# Patient Record
Sex: FEMALE | Race: WHITE | NOT HISPANIC OR LATINO
[De-identification: names, ages, dates, MRNs, and addresses within clinical notes are randomized per-mention and may not be internally consistent; named-entity substitution may affect disease eponyms.]

---

## 2018-01-18 ENCOUNTER — APPOINTMENT (OUTPATIENT)
Dept: OBGYN | Facility: CLINIC | Age: 58
End: 2018-01-18
Payer: COMMERCIAL

## 2018-01-18 VITALS
BODY MASS INDEX: 29.32 KG/M2 | SYSTOLIC BLOOD PRESSURE: 128 MMHG | HEIGHT: 65 IN | WEIGHT: 176 LBS | DIASTOLIC BLOOD PRESSURE: 72 MMHG

## 2018-01-18 DIAGNOSIS — Z12.11 ENCOUNTER FOR SCREENING FOR MALIGNANT NEOPLASM OF COLON: ICD-10-CM

## 2018-01-18 DIAGNOSIS — Z80.3 FAMILY HISTORY OF MALIGNANT NEOPLASM OF BREAST: ICD-10-CM

## 2018-01-18 DIAGNOSIS — Z78.9 OTHER SPECIFIED HEALTH STATUS: ICD-10-CM

## 2018-01-18 DIAGNOSIS — Z15.01 GENETIC SUSCEPTIBILITY TO MALIGNANT NEOPLASM OF BREAST: ICD-10-CM

## 2018-01-18 DIAGNOSIS — Z15.09 GENETIC SUSCEPTIBILITY TO MALIGNANT NEOPLASM OF BREAST: ICD-10-CM

## 2018-01-18 DIAGNOSIS — L98.9 DISORDER OF THE SKIN AND SUBCUTANEOUS TISSUE, UNSPECIFIED: ICD-10-CM

## 2018-01-18 DIAGNOSIS — F15.90 OTHER STIMULANT USE, UNSPECIFIED, UNCOMPLICATED: ICD-10-CM

## 2018-01-18 DIAGNOSIS — Z80.41 FAMILY HISTORY OF MALIGNANT NEOPLASM OF OVARY: ICD-10-CM

## 2018-01-18 DIAGNOSIS — R23.2 FLUSHING: ICD-10-CM

## 2018-01-18 DIAGNOSIS — H54.7 UNSPECIFIED VISUAL LOSS: ICD-10-CM

## 2018-01-18 DIAGNOSIS — Z78.0 ASYMPTOMATIC MENOPAUSAL STATE: ICD-10-CM

## 2018-01-18 DIAGNOSIS — F17.200 NICOTINE DEPENDENCE, UNSPECIFIED, UNCOMPLICATED: ICD-10-CM

## 2018-01-18 PROBLEM — Z00.00 ENCOUNTER FOR PREVENTIVE HEALTH EXAMINATION: Status: ACTIVE | Noted: 2018-01-18

## 2018-01-18 LAB
DATE COLLECTED: NORMAL
HEMOCCULT SP1 STL QL: NEGATIVE
QUALITY CONTROL: YES

## 2018-01-18 PROCEDURE — 99386 PREV VISIT NEW AGE 40-64: CPT

## 2018-01-18 PROCEDURE — 82270 OCCULT BLOOD FECES: CPT

## 2018-01-18 PROCEDURE — 99204 OFFICE O/P NEW MOD 45 MIN: CPT | Mod: 25

## 2018-01-18 RX ORDER — QUETIAPINE 100 MG/1
100 TABLET, FILM COATED ORAL
Refills: 0 | Status: ACTIVE | COMMUNITY
Start: 2018-01-18

## 2018-01-18 RX ORDER — BUPRENORPHINE HYDROCHLORIDE, NALOXONE HYDROCHLORIDE 12; 3 MG/1; MG/1
12-3 FILM, SOLUBLE BUCCAL; SUBLINGUAL
Refills: 0 | Status: ACTIVE | COMMUNITY
Start: 2018-01-18

## 2018-01-18 RX ORDER — TRIAMCINOLONE ACETONIDE 1 MG/G
0.1 CREAM TOPICAL
Qty: 80 | Refills: 0 | Status: ACTIVE | COMMUNITY
Start: 2017-10-09

## 2018-01-18 RX ORDER — BUPRENORPHINE HYDROCHLORIDE, NALOXONE HYDROCHLORIDE 2; .5 MG/1; MG/1
2-0.5 FILM, SOLUBLE BUCCAL; SUBLINGUAL
Qty: 82 | Refills: 0 | Status: ACTIVE | COMMUNITY
Start: 2017-07-19

## 2018-01-18 RX ORDER — DULOXETINE HYDROCHLORIDE 20 MG/1
20 CAPSULE, DELAYED RELEASE ORAL
Refills: 0 | Status: ACTIVE | COMMUNITY
Start: 2018-01-18

## 2018-01-18 RX ORDER — PREGABALIN 150 MG/1
150 CAPSULE ORAL
Refills: 0 | Status: ACTIVE | COMMUNITY
Start: 2018-01-18

## 2018-01-18 RX ORDER — FLUCONAZOLE 100 MG/1
100 TABLET ORAL
Qty: 16 | Refills: 0 | Status: ACTIVE | COMMUNITY
Start: 2017-07-25

## 2018-01-18 RX ORDER — PREGABALIN 75 MG/1
75 CAPSULE ORAL
Qty: 63 | Refills: 0 | Status: ACTIVE | COMMUNITY
Start: 2017-07-05

## 2018-01-26 LAB
C TRACH RRNA SPEC QL NAA+PROBE: NOT DETECTED
CYTOLOGY CVX/VAG DOC THIN PREP: NORMAL
HPV HIGH+LOW RISK DNA PNL CVX: NOT DETECTED
N GONORRHOEA RRNA SPEC QL NAA+PROBE: NOT DETECTED
SOURCE TP AMPLIFICATION: NORMAL

## 2018-02-01 ENCOUNTER — APPOINTMENT (OUTPATIENT)
Dept: MAMMOGRAPHY | Facility: CLINIC | Age: 58
End: 2018-02-01

## 2018-02-01 ENCOUNTER — APPOINTMENT (OUTPATIENT)
Dept: RADIOLOGY | Facility: CLINIC | Age: 58
End: 2018-02-01

## 2018-02-01 ENCOUNTER — APPOINTMENT (OUTPATIENT)
Dept: ULTRASOUND IMAGING | Facility: CLINIC | Age: 58
End: 2018-02-01

## 2018-02-02 ENCOUNTER — APPOINTMENT (OUTPATIENT)
Dept: MRI IMAGING | Facility: CLINIC | Age: 58
End: 2018-02-02
Payer: COMMERCIAL

## 2018-02-02 ENCOUNTER — OUTPATIENT (OUTPATIENT)
Dept: OUTPATIENT SERVICES | Facility: HOSPITAL | Age: 58
LOS: 1 days | End: 2018-02-02
Payer: COMMERCIAL

## 2018-02-02 DIAGNOSIS — Z00.8 ENCOUNTER FOR OTHER GENERAL EXAMINATION: ICD-10-CM

## 2018-02-02 PROCEDURE — 0159T: CPT | Mod: 26

## 2018-02-02 PROCEDURE — C8937: CPT

## 2018-02-02 PROCEDURE — 77059 MRI BREAST BILATERAL: CPT | Mod: 26

## 2018-02-02 PROCEDURE — C8908: CPT

## 2018-02-02 PROCEDURE — A9585: CPT

## 2018-02-07 ENCOUNTER — RESULT REVIEW (OUTPATIENT)
Age: 58
End: 2018-02-07

## 2018-02-07 ENCOUNTER — APPOINTMENT (OUTPATIENT)
Dept: ULTRASOUND IMAGING | Facility: CLINIC | Age: 58
End: 2018-02-07
Payer: COMMERCIAL

## 2018-02-07 ENCOUNTER — OUTPATIENT (OUTPATIENT)
Dept: OUTPATIENT SERVICES | Facility: HOSPITAL | Age: 58
LOS: 1 days | End: 2018-02-07
Payer: COMMERCIAL

## 2018-02-07 DIAGNOSIS — Z00.8 ENCOUNTER FOR OTHER GENERAL EXAMINATION: ICD-10-CM

## 2018-02-07 PROCEDURE — 10022: CPT

## 2018-02-07 PROCEDURE — 76942 ECHO GUIDE FOR BIOPSY: CPT

## 2018-02-07 PROCEDURE — 76942 ECHO GUIDE FOR BIOPSY: CPT | Mod: 26

## 2018-02-09 ENCOUNTER — OUTPATIENT (OUTPATIENT)
Dept: OUTPATIENT SERVICES | Facility: HOSPITAL | Age: 58
LOS: 1 days | End: 2018-02-09
Payer: COMMERCIAL

## 2018-02-09 VITALS
RESPIRATION RATE: 16 BRPM | TEMPERATURE: 98 F | HEART RATE: 83 BPM | SYSTOLIC BLOOD PRESSURE: 112 MMHG | HEIGHT: 64 IN | WEIGHT: 172.4 LBS | DIASTOLIC BLOOD PRESSURE: 79 MMHG

## 2018-02-09 DIAGNOSIS — Z96.643 PRESENCE OF ARTIFICIAL HIP JOINT, BILATERAL: Chronic | ICD-10-CM

## 2018-02-09 DIAGNOSIS — Z01.818 ENCOUNTER FOR OTHER PREPROCEDURAL EXAMINATION: ICD-10-CM

## 2018-02-09 DIAGNOSIS — R19.00 INTRA-ABDOMINAL AND PELVIC SWELLING, MASS AND LUMP, UNSPECIFIED SITE: ICD-10-CM

## 2018-02-09 LAB
ANION GAP SERPL CALC-SCNC: 15 MMOL/L — SIGNIFICANT CHANGE UP (ref 5–17)
APTT BLD: 29.2 SEC — SIGNIFICANT CHANGE UP (ref 27.5–37.4)
BASOPHILS # BLD AUTO: 0.1 K/UL — SIGNIFICANT CHANGE UP (ref 0–0.2)
BASOPHILS NFR BLD AUTO: 2.2 % — HIGH (ref 0–2)
BLD GP AB SCN SERPL QL: SIGNIFICANT CHANGE UP
BUN SERPL-MCNC: 18 MG/DL — SIGNIFICANT CHANGE UP (ref 8–20)
CALCIUM SERPL-MCNC: 9.5 MG/DL — SIGNIFICANT CHANGE UP (ref 8.6–10.2)
CHLORIDE SERPL-SCNC: 100 MMOL/L — SIGNIFICANT CHANGE UP (ref 98–107)
CO2 SERPL-SCNC: 25 MMOL/L — SIGNIFICANT CHANGE UP (ref 22–29)
CREAT SERPL-MCNC: 0.75 MG/DL — SIGNIFICANT CHANGE UP (ref 0.5–1.3)
EOSINOPHIL # BLD AUTO: 0.3 K/UL — SIGNIFICANT CHANGE UP (ref 0–0.5)
EOSINOPHIL NFR BLD AUTO: 5.1 % — SIGNIFICANT CHANGE UP (ref 0–6)
GLUCOSE SERPL-MCNC: 96 MG/DL — SIGNIFICANT CHANGE UP (ref 70–115)
HBA1C BLD-MCNC: 5.4 % — SIGNIFICANT CHANGE UP (ref 4–5.6)
HCT VFR BLD CALC: 44.3 % — SIGNIFICANT CHANGE UP (ref 37–47)
HGB BLD-MCNC: 15 G/DL — SIGNIFICANT CHANGE UP (ref 12–16)
INR BLD: 0.97 RATIO — SIGNIFICANT CHANGE UP (ref 0.88–1.16)
LYMPHOCYTES # BLD AUTO: 2.7 K/UL — SIGNIFICANT CHANGE UP (ref 1–4.8)
LYMPHOCYTES # BLD AUTO: 46.2 % — SIGNIFICANT CHANGE UP (ref 20–55)
MCHC RBC-ENTMCNC: 28.8 PG — SIGNIFICANT CHANGE UP (ref 27–31)
MCHC RBC-ENTMCNC: 33.9 G/DL — SIGNIFICANT CHANGE UP (ref 32–36)
MCV RBC AUTO: 85.2 FL — SIGNIFICANT CHANGE UP (ref 81–99)
MONOCYTES # BLD AUTO: 0.5 K/UL — SIGNIFICANT CHANGE UP (ref 0–0.8)
MONOCYTES NFR BLD AUTO: 8 % — SIGNIFICANT CHANGE UP (ref 3–10)
NEUTROPHILS # BLD AUTO: 2.3 K/UL — SIGNIFICANT CHANGE UP (ref 1.8–8)
NEUTROPHILS NFR BLD AUTO: 38.3 % — SIGNIFICANT CHANGE UP (ref 37–73)
PLATELET # BLD AUTO: 265 K/UL — SIGNIFICANT CHANGE UP (ref 150–400)
POTASSIUM SERPL-MCNC: 4.6 MMOL/L — SIGNIFICANT CHANGE UP (ref 3.5–5.3)
POTASSIUM SERPL-SCNC: 4.6 MMOL/L — SIGNIFICANT CHANGE UP (ref 3.5–5.3)
PROTHROM AB SERPL-ACNC: 10.7 SEC — SIGNIFICANT CHANGE UP (ref 9.8–12.7)
RBC # BLD: 5.2 M/UL — SIGNIFICANT CHANGE UP (ref 4.4–5.2)
RBC # FLD: 12.9 % — SIGNIFICANT CHANGE UP (ref 11–15.6)
SODIUM SERPL-SCNC: 140 MMOL/L — SIGNIFICANT CHANGE UP (ref 135–145)
TYPE + AB SCN PNL BLD: SIGNIFICANT CHANGE UP
WBC # BLD: 5.9 K/UL — SIGNIFICANT CHANGE UP (ref 4.8–10.8)
WBC # FLD AUTO: 5.9 K/UL — SIGNIFICANT CHANGE UP (ref 4.8–10.8)

## 2018-02-09 PROCEDURE — 80048 BASIC METABOLIC PNL TOTAL CA: CPT

## 2018-02-09 PROCEDURE — 86304 IMMUNOASSAY TUMOR CA 125: CPT

## 2018-02-09 PROCEDURE — 93010 ELECTROCARDIOGRAM REPORT: CPT

## 2018-02-09 PROCEDURE — 85027 COMPLETE CBC AUTOMATED: CPT

## 2018-02-09 PROCEDURE — 85730 THROMBOPLASTIN TIME PARTIAL: CPT

## 2018-02-09 PROCEDURE — G0463: CPT

## 2018-02-09 PROCEDURE — 83036 HEMOGLOBIN GLYCOSYLATED A1C: CPT

## 2018-02-09 PROCEDURE — 93005 ELECTROCARDIOGRAM TRACING: CPT

## 2018-02-09 PROCEDURE — 86900 BLOOD TYPING SEROLOGIC ABO: CPT

## 2018-02-09 PROCEDURE — 85610 PROTHROMBIN TIME: CPT

## 2018-02-09 PROCEDURE — 86901 BLOOD TYPING SEROLOGIC RH(D): CPT

## 2018-02-09 PROCEDURE — 71046 X-RAY EXAM CHEST 2 VIEWS: CPT | Mod: 26

## 2018-02-09 PROCEDURE — 86850 RBC ANTIBODY SCREEN: CPT

## 2018-02-09 PROCEDURE — 71046 X-RAY EXAM CHEST 2 VIEWS: CPT

## 2018-02-09 PROCEDURE — 36415 COLL VENOUS BLD VENIPUNCTURE: CPT

## 2018-02-09 NOTE — ASU PATIENT PROFILE, ADULT - PMH
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

## 2018-02-09 NOTE — H&P PST ADULT - FAMILY HISTORY
Mother  Still living? No  Family history of breast cancer, Age at diagnosis: Age Unknown  Family history of ovarian cancer, Age at diagnosis: Age Unknown

## 2018-02-09 NOTE — H&P PST ADULT - ASSESSMENT
57 year old female with h/o AVN s/p b/l hip replacements. Patient reports a strong family history of breast and ovarian Ca. She states she had genetic testing and was BRCA 2 and chek 2 Positive.   She had been followed closely by her GYN and on the last ultrasound, Patient states there was "mass/ shadow ovary " .  She is now schedule for  robotic assisted total laparoscopic hysterectomy, bilateral salpingo oophorectomy cystoscopy , frozen, possible staging, possible laparotomy.

## 2018-02-09 NOTE — ASU PATIENT PROFILE, ADULT - LEARNING ASSESSMENT (PATIENT) ADDITIONAL COMMENTS
Instructed pt on pre-op instructions, tips for safer surgery, pain management scale and smoking cessation pamphlets, [facility] Schedule given to pt and verbalized understanding of all.

## 2018-02-09 NOTE — H&P PST ADULT - PROBLEM SELECTOR PLAN 1
Medical clearance    Robotic assisted total laparoscopic hysterectomy, bilateral salpingo oophorectomy cystoscopy , frozen, possible staging, possible laparotomy.

## 2018-02-10 LAB — CANCER AG125 SERPL-ACNC: 11 U/ML — SIGNIFICANT CHANGE UP

## 2018-02-27 ENCOUNTER — INPATIENT (INPATIENT)
Facility: HOSPITAL | Age: 58
LOS: 1 days | Discharge: HOME | DRG: 983 | End: 2018-03-01
Attending: OBSTETRICS & GYNECOLOGY | Admitting: OBSTETRICS & GYNECOLOGY
Payer: COMMERCIAL

## 2018-02-27 ENCOUNTER — RESULT REVIEW (OUTPATIENT)
Age: 58
End: 2018-02-27

## 2018-02-27 VITALS
OXYGEN SATURATION: 94 % | RESPIRATION RATE: 18 BRPM | WEIGHT: 160.06 LBS | SYSTOLIC BLOOD PRESSURE: 11 MMHG | HEIGHT: 65 IN | HEART RATE: 88 BPM | TEMPERATURE: 98 F | DIASTOLIC BLOOD PRESSURE: 60 MMHG

## 2018-02-27 DIAGNOSIS — Z87.898 PERSONAL HISTORY OF OTHER SPECIFIED CONDITIONS: ICD-10-CM

## 2018-02-27 DIAGNOSIS — Z96.643 PRESENCE OF ARTIFICIAL HIP JOINT, BILATERAL: Chronic | ICD-10-CM

## 2018-02-27 DIAGNOSIS — G89.18 OTHER ACUTE POSTPROCEDURAL PAIN: ICD-10-CM

## 2018-02-27 DIAGNOSIS — Z15.01 GENETIC SUSCEPTIBILITY TO MALIGNANT NEOPLASM OF BREAST: ICD-10-CM

## 2018-02-27 DIAGNOSIS — R19.00 INTRA-ABDOMINAL AND PELVIC SWELLING, MASS AND LUMP, UNSPECIFIED SITE: ICD-10-CM

## 2018-02-27 LAB
BLD GP AB SCN SERPL QL: SIGNIFICANT CHANGE UP
GLUCOSE BLDC GLUCOMTR-MCNC: 107 MG/DL — HIGH (ref 70–99)
GLUCOSE BLDC GLUCOMTR-MCNC: 108 MG/DL — HIGH (ref 70–99)
GLUCOSE BLDC GLUCOMTR-MCNC: 116 MG/DL — HIGH (ref 70–99)
TYPE + AB SCN PNL BLD: SIGNIFICANT CHANGE UP

## 2018-02-27 PROCEDURE — 88307 TISSUE EXAM BY PATHOLOGIST: CPT | Mod: 26

## 2018-02-27 PROCEDURE — 88305 TISSUE EXAM BY PATHOLOGIST: CPT | Mod: 26

## 2018-02-27 RX ORDER — METHOCARBAMOL 500 MG/1
500 TABLET, FILM COATED ORAL EVERY 8 HOURS
Qty: 0 | Refills: 0 | Status: DISCONTINUED | OUTPATIENT
Start: 2018-02-27 | End: 2018-02-28

## 2018-02-27 RX ORDER — BUPIVACAINE 13.3 MG/ML
20 INJECTION, SUSPENSION, LIPOSOMAL INFILTRATION ONCE
Qty: 0 | Refills: 0 | Status: DISCONTINUED | OUTPATIENT
Start: 2018-02-27 | End: 2018-02-27

## 2018-02-27 RX ORDER — ONDANSETRON 8 MG/1
4 TABLET, FILM COATED ORAL EVERY 6 HOURS
Qty: 0 | Refills: 0 | Status: DISCONTINUED | OUTPATIENT
Start: 2018-02-27 | End: 2018-03-01

## 2018-02-27 RX ORDER — BUPRENORPHINE AND NALOXONE 2; .5 MG/1; MG/1
2 TABLET SUBLINGUAL DAILY
Qty: 0 | Refills: 0 | Status: DISCONTINUED | OUTPATIENT
Start: 2018-02-28 | End: 2018-03-01

## 2018-02-27 RX ORDER — QUETIAPINE FUMARATE 200 MG/1
1 TABLET, FILM COATED ORAL
Qty: 0 | Refills: 0 | COMMUNITY

## 2018-02-27 RX ORDER — ENOXAPARIN SODIUM 100 MG/ML
40 INJECTION SUBCUTANEOUS ONCE
Qty: 0 | Refills: 0 | Status: COMPLETED | OUTPATIENT
Start: 2018-02-27 | End: 2018-02-27

## 2018-02-27 RX ORDER — ENOXAPARIN SODIUM 100 MG/ML
40 INJECTION SUBCUTANEOUS DAILY
Qty: 0 | Refills: 0 | Status: DISCONTINUED | OUTPATIENT
Start: 2018-02-28 | End: 2018-03-01

## 2018-02-27 RX ORDER — HYDROMORPHONE HYDROCHLORIDE 2 MG/ML
1 INJECTION INTRAMUSCULAR; INTRAVENOUS; SUBCUTANEOUS
Qty: 0 | Refills: 0 | Status: DISCONTINUED | OUTPATIENT
Start: 2018-02-27 | End: 2018-02-27

## 2018-02-27 RX ORDER — ACETAMINOPHEN 500 MG
1000 TABLET ORAL ONCE
Qty: 0 | Refills: 0 | Status: COMPLETED | OUTPATIENT
Start: 2018-02-27 | End: 2018-02-27

## 2018-02-27 RX ORDER — DULOXETINE HYDROCHLORIDE 30 MG/1
1 CAPSULE, DELAYED RELEASE ORAL
Qty: 0 | Refills: 0 | COMMUNITY

## 2018-02-27 RX ORDER — SODIUM CHLORIDE 9 MG/ML
3 INJECTION INTRAMUSCULAR; INTRAVENOUS; SUBCUTANEOUS ONCE
Qty: 0 | Refills: 0 | Status: DISCONTINUED | OUTPATIENT
Start: 2018-02-27 | End: 2018-02-27

## 2018-02-27 RX ORDER — FENTANYL CITRATE 50 UG/ML
50 INJECTION INTRAVENOUS
Qty: 0 | Refills: 0 | Status: DISCONTINUED | OUTPATIENT
Start: 2018-02-27 | End: 2018-02-27

## 2018-02-27 RX ORDER — KETOROLAC TROMETHAMINE 30 MG/ML
15 SYRINGE (ML) INJECTION EVERY 6 HOURS
Qty: 0 | Refills: 0 | Status: DISCONTINUED | OUTPATIENT
Start: 2018-02-27 | End: 2018-02-28

## 2018-02-27 RX ORDER — SODIUM CHLORIDE 9 MG/ML
1000 INJECTION, SOLUTION INTRAVENOUS
Qty: 0 | Refills: 0 | Status: DISCONTINUED | OUTPATIENT
Start: 2018-02-27 | End: 2018-02-27

## 2018-02-27 RX ORDER — KETOROLAC TROMETHAMINE 30 MG/ML
30 SYRINGE (ML) INJECTION EVERY 6 HOURS
Qty: 0 | Refills: 0 | Status: DISCONTINUED | OUTPATIENT
Start: 2018-02-27 | End: 2018-02-27

## 2018-02-27 RX ORDER — DEXTROSE MONOHYDRATE, SODIUM CHLORIDE, AND POTASSIUM CHLORIDE 50; .745; 4.5 G/1000ML; G/1000ML; G/1000ML
1000 INJECTION, SOLUTION INTRAVENOUS
Qty: 0 | Refills: 0 | Status: DISCONTINUED | OUTPATIENT
Start: 2018-02-27 | End: 2018-03-01

## 2018-02-27 RX ORDER — ACETAMINOPHEN 500 MG
650 TABLET ORAL EVERY 6 HOURS
Qty: 0 | Refills: 0 | Status: DISCONTINUED | OUTPATIENT
Start: 2018-02-27 | End: 2018-02-28

## 2018-02-27 RX ORDER — CEFOTETAN DISODIUM 1 G
2 VIAL (EA) INJECTION ONCE
Qty: 0 | Refills: 0 | Status: COMPLETED | OUTPATIENT
Start: 2018-02-27 | End: 2018-02-27

## 2018-02-27 RX ORDER — ONDANSETRON 8 MG/1
4 TABLET, FILM COATED ORAL ONCE
Qty: 0 | Refills: 0 | Status: DISCONTINUED | OUTPATIENT
Start: 2018-02-27 | End: 2018-02-27

## 2018-02-27 RX ORDER — QUETIAPINE FUMARATE 200 MG/1
25 TABLET, FILM COATED ORAL DAILY
Qty: 0 | Refills: 0 | Status: DISCONTINUED | OUTPATIENT
Start: 2018-02-27 | End: 2018-03-01

## 2018-02-27 RX ORDER — BUPRENORPHINE AND NALOXONE 2; .5 MG/1; MG/1
1 TABLET SUBLINGUAL AT BEDTIME
Qty: 0 | Refills: 0 | Status: DISCONTINUED | OUTPATIENT
Start: 2018-02-27 | End: 2018-03-01

## 2018-02-27 RX ORDER — CEFOTETAN DISODIUM 1 G
1 VIAL (EA) INJECTION ONCE
Qty: 0 | Refills: 0 | Status: COMPLETED | OUTPATIENT
Start: 2018-02-27 | End: 2018-02-27

## 2018-02-27 RX ORDER — DULOXETINE HYDROCHLORIDE 30 MG/1
60 CAPSULE, DELAYED RELEASE ORAL DAILY
Qty: 0 | Refills: 0 | Status: DISCONTINUED | OUTPATIENT
Start: 2018-02-27 | End: 2018-03-01

## 2018-02-27 RX ADMIN — Medication 15 MILLIGRAM(S): at 16:00

## 2018-02-27 RX ADMIN — HYDROMORPHONE HYDROCHLORIDE 1 MILLIGRAM(S): 2 INJECTION INTRAMUSCULAR; INTRAVENOUS; SUBCUTANEOUS at 12:47

## 2018-02-27 RX ADMIN — METHOCARBAMOL 500 MILLIGRAM(S): 500 TABLET, FILM COATED ORAL at 14:17

## 2018-02-27 RX ADMIN — Medication 400 MILLIGRAM(S): at 11:00

## 2018-02-27 RX ADMIN — HYDROMORPHONE HYDROCHLORIDE 1 MILLIGRAM(S): 2 INJECTION INTRAMUSCULAR; INTRAVENOUS; SUBCUTANEOUS at 13:05

## 2018-02-27 RX ADMIN — Medication 15 MILLIGRAM(S): at 21:48

## 2018-02-27 RX ADMIN — ENOXAPARIN SODIUM 40 MILLIGRAM(S): 100 INJECTION SUBCUTANEOUS at 18:00

## 2018-02-27 RX ADMIN — HYDROMORPHONE HYDROCHLORIDE 1 MILLIGRAM(S): 2 INJECTION INTRAMUSCULAR; INTRAVENOUS; SUBCUTANEOUS at 13:27

## 2018-02-27 RX ADMIN — Medication 15 MILLIGRAM(S): at 15:28

## 2018-02-27 RX ADMIN — Medication 75 MILLIGRAM(S): at 21:48

## 2018-02-27 RX ADMIN — Medication 15 MILLIGRAM(S): at 22:00

## 2018-02-27 RX ADMIN — HYDROMORPHONE HYDROCHLORIDE 1 MILLIGRAM(S): 2 INJECTION INTRAMUSCULAR; INTRAVENOUS; SUBCUTANEOUS at 13:45

## 2018-02-27 RX ADMIN — Medication 650 MILLIGRAM(S): at 20:00

## 2018-02-27 RX ADMIN — DEXTROSE MONOHYDRATE, SODIUM CHLORIDE, AND POTASSIUM CHLORIDE 125 MILLILITER(S): 50; .745; 4.5 INJECTION, SOLUTION INTRAVENOUS at 18:00

## 2018-02-27 RX ADMIN — HYDROMORPHONE HYDROCHLORIDE 1 MILLIGRAM(S): 2 INJECTION INTRAMUSCULAR; INTRAVENOUS; SUBCUTANEOUS at 12:34

## 2018-02-27 RX ADMIN — HYDROMORPHONE HYDROCHLORIDE 1 MILLIGRAM(S): 2 INJECTION INTRAMUSCULAR; INTRAVENOUS; SUBCUTANEOUS at 13:20

## 2018-02-27 RX ADMIN — Medication 650 MILLIGRAM(S): at 19:16

## 2018-02-27 RX ADMIN — SODIUM CHLORIDE 125 MILLILITER(S): 9 INJECTION, SOLUTION INTRAVENOUS at 12:34

## 2018-02-27 RX ADMIN — QUETIAPINE FUMARATE 25 MILLIGRAM(S): 200 TABLET, FILM COATED ORAL at 21:48

## 2018-02-27 RX ADMIN — DULOXETINE HYDROCHLORIDE 60 MILLIGRAM(S): 30 CAPSULE, DELAYED RELEASE ORAL at 21:49

## 2018-02-27 RX ADMIN — Medication 100 GRAM(S): at 19:51

## 2018-02-27 RX ADMIN — Medication 100 GRAM(S): at 10:33

## 2018-02-27 NOTE — PROGRESS NOTE ADULT - PROBLEM SELECTOR PLAN 1
s/p above procedure prophylactically   - continue regular diet as tolerated  - OOB as tolerated   - SCDs and Lovenox for DVT prophylaxis   - Encourage incentive spirometer use  - Continue IVFs  - Follow up AM labs   - Continue pan control as per pain management recommendations s/p above procedure prophylactically   - Give 15mg IV Toradol x 1 Now  - continue regular diet as tolerated  - OOB as tolerated   - SCDs and Lovenox for DVT prophylaxis   - Encourage incentive spirometer use  - Continue IVFs  - Follow up AM labs   - Continue pan control as per pain management recommendations

## 2018-02-27 NOTE — CONSULT NOTE ADULT - SUBJECTIVE AND OBJECTIVE BOX
Chief Complaint: post operative pain    HPI: This is a 56 yo female who has a PMH of AVN, bilateral hip replacements OA who is now POD #0 s/p total hysterectomy robotic. History of suboxone usage for drug abuse about 5 years ago. Her last dose was this morning. Patient seen and examined at bedside in PACU. Patient is reporting that her pain is not well controlled with current regimen. Received 5 mg IV dilaudid over the last hour without relief of her pain. Pain is 8/10 at this time. She is reporting pain is constant and severe across the abdomen. Pain is pressure like and cramping. Pain is worsened with movements. Denies and side effects from medications. Tolerating full diet.       PAST MEDICAL & SURGICAL HISTORY:  Gene mutation: + check 2 gene mutation  BRCA2 positive  AVN (avascular necrosis of bone)  OA (osteoarthritis)  Chronic pain  S/P hip replacement, bilateral      FAMILY HISTORY:  Family history of ovarian cancer (Mother)  Family history of breast cancer (Mother)      SOCIAL HISTORY:  [ ] Denies Smoking, Alcohol, or Drug Use    Allergies    No Known Allergies    Intolerances        PAIN MEDICATIONS:  acetaminophen   Tablet. 650 milliGRAM(s) Oral every 6 hours PRN  DULoxetine 60 milliGRAM(s) Oral daily  ketorolac   Injectable 15 milliGRAM(s) IV Push every 6 hours PRN  methocarbamol 500 milliGRAM(s) Oral every 8 hours PRN  ondansetron Injectable 4 milliGRAM(s) IV Push every 6 hours PRN  ondansetron Injectable 4 milliGRAM(s) IV Push once PRN  pregabalin 150 milliGRAM(s) Oral daily  pregabalin 75 milliGRAM(s) Oral at bedtime  promethazine IVPB 6.25 milliGRAM(s) IV Intermittent once PRN  QUEtiapine 25 milliGRAM(s) Oral daily    Heme:  enoxaparin Injectable 40 milliGRAM(s) SubCutaneous once    Antibiotics:  cefoTEtan  IVPB 1 Gram(s) IV Intermittent once    Cardiovascular:    GI:    Endocrine:    All Other Medications:  dextrose 5% + sodium chloride 0.45% with potassium chloride 20 mEq/L 1000 milliLiter(s) IV Continuous <Continuous>  lactated ringers. 1000 milliLiter(s) IV Continuous <Continuous>      REVIEW OF SYSTEMS:    CONSTITUTIONAL: No fever, weight loss, or fatigue  EYES: No eye pain, visual disturbances, or discharge  ENMT:  No difficulty hearing, tinnitus, vertigo; No sinus or throat pain  NECK: No pain or stiffness  RESPIRATORY: No cough, wheezing, chills or hemoptysis; No shortness of breath  CARDIOVASCULAR: No chest pain, palpitations, dizziness, or leg swelling  GASTROINTESTINAL: + post surgical abdominal or epigastric pain. No nausea, vomiting, or hematemesis; No diarrhea or constipation. No melena or hematochezia.  GENITOURINARY: No dysuria, frequency, hematuria, or incontinence  NEUROLOGICAL: No headaches, memory loss, loss of strength, numbness, or tremors  SKIN: No itching, burning, rashes, or lesions   LYMPH NODES: No enlarged glands  ENDOCRINE: No heat or cold intolerance; No hair loss  MUSCULOSKELETAL: No joint pain or swelling; No muscle, back, or extremity pain  PSYCHIATRIC: No depression, anxiety, mood swings, or difficulty sleeping  HEME/LYMPH: No easy bruising, or bleeding gums  ALLERY AND IMMUNOLOGIC: No hives or eczema      Vital Signs Last 24 Hrs  T(C): 36.2 (27 Feb 2018 12:16), Max: 36.4 (27 Feb 2018 08:16)  T(F): 97.2 (27 Feb 2018 12:16), Max: 97.5 (27 Feb 2018 08:16)  HR: 65 (27 Feb 2018 13:30) (65 - 88)  BP: 113/76 (27 Feb 2018 13:30) (11/60 - 133/94)  BP(mean): --  RR: 15 (27 Feb 2018 13:30) (14 - 18)  SpO2: 100% (27 Feb 2018 13:30) (94% - 100%)    PAIN SCORE:     8    SCALE USED: (1-10 VNRS)             PHYSICAL EXAM:    GENERAL: NAD, well-groomed, well-developed  HEAD:  Atraumatic, Normocephalic  EYES: EOMI, PERRLA, conjunctiva and sclera clear  ENMT: No tonsillar erythema, exudates, or enlargement; Moist mucous membranes, Good dentition, No lesions  NECK: Supple, No JVD, Normal thyroid  NERVOUS SYSTEM:  Alert & Oriented X3, Good concentration; Motor Strength 5/5 B/L upper and lower extremities; DTRs 2+ intact and symmetric  CHEST/LUNG: Clear to percussion bilaterally; No rales, rhonchi, wheezing, or rubs  HEART: Regular rate and rhythm; No murmurs, rubs, or gallops  ABDOMEN: Soft, Nontender, Nondistended; Bowel sounds present, dressing clean, dry, intact  EXTREMITIES:  2+ Peripheral Pulses, No clubbing, cyanosis, or edema  LYMPH: No lymphadenopathy noted  SKIN: No rashes or lesions        LABS:                  RADIOLOGY:    Drug Screen:            [X ]  [place]  Reviewed and Copied to Chart      Search Terms: joselito rosales, 1960 Search Date: 02/27/2018 01:52:19 PM  The Drug Utilization Report below displays all of the controlled substance prescriptions, if any, that your patient has filled in the last twelve months. The information displayed on this report is compiled from pharmacy submissions to the Department, and accurately reflects the information as submitted by the pharmacies.      Others' Prescriptions  Patient Name:	Joselito Rosales	YOB: 1960  Address:	[address] [place] [address]	Sex:	Female  Rx Written	Rx Dispensed	Drug	Quantity	Days Supply	Prescriber Name  01/19/2018	02/10/2018	lyrica 75 mg capsule	63	21	Robby Biggs MD  01/24/2018	01/31/2018	suboxone 2 mg-0.5 mg sl film	82	30	Shanae Molina KRISTEN  01/17/2018	01/20/2018	lyrica 75 mg capsule	63	21	MolinaShanae KRISTEN  01/03/2018	01/03/2018	suboxone 2 mg-0.5 mg sl film	82	30	Shanae Molina KRISTEN  12/05/2017	12/30/2017	lyrica 75 mg capsule	63	21	Shanae Molina KRISTEN  12/05/2017	12/12/2017	lyrica 75 mg capsule	63	21	Jesse Shanae OLIVER KRISTEN  12/05/2017	12/05/2017	suboxone 2 mg-0.5 mg sl film	82	30	Shanae Molina NP  11/21/2017	11/22/2017	lyrica 75 mg capsule	63	21	Shanae Molina NP  11/08/2017	11/08/2017	suboxone 2 mg-0.5 mg sl film	82	30	Shaane Molina NP  10/11/2017	11/02/2017	lyrica 75 mg capsule	63	21	Shanae Molina NP  10/11/2017	10/11/2017	lyrica 75 mg capsule	63	21	Shanae Molina KRISTEN  10/11/2017	10/11/2017	suboxone 2 mg-0.5 mg sl film	82	30	Shanae Molina NP  09/22/2017	09/25/2017	lyrica 75 mg capsule	63	21	Robby Biggs MD  09/13/2017	09/13/2017	suboxone 2 mg-0.5 mg sl film	82	30	Shanae Molina NP  08/16/2017	09/06/2017	lyrica 75 mg capsule	63	21	Shanae Molina F NP  08/16/2017	08/18/2017	lyrica 75 mg capsule	63	21	Shanae Molina F NP  08/16/2017	08/18/2017	suboxone 2 mg-0.5 mg sl film	82	30	Robby Biggs MD  07/05/2017	07/27/2017	lyrica 75 mg capsule	63	21	Shanae Molina NP  07/19/2017	07/21/2017	suboxone 2 mg-0.5 mg sl film	82	30	Shanae Molina NP  07/05/2017	07/08/2017	lyrica 75 mg capsule	63	21	Shanae Molina F NP  06/21/2017	06/24/2017	suboxone 2 mg-0.5 mg sl film	82	30	Shanae Molina F NP  05/24/2017	06/19/2017	lyrica 75 mg capsule	63	21	Shanae Molina F NP  05/24/2017	05/30/2017	lyrica 75 mg capsule	63	21	Shanae Molina F NP  05/24/2017	05/26/2017	suboxone 2 mg-0.5 mg sl film	82	30	Jesse, Shanae F NP  05/09/2017	05/10/2017	lyrica 75 mg capsule	63	21	Jesse, Shanae F NP  04/26/2017	04/29/2017	suboxone 2 mg-0.5 mg sl film	82	30	Jesse, Shanae F NP  03/29/2017	04/20/2017	lyrica 75 mg capsule	63	21	Jesse, Shanae F NP  03/29/2017	04/01/2017	lyrica 75 mg capsule	63	21	Jesse, Shanae F NP  03/29/2017	03/29/2017	suboxone 2 mg-0.5 mg sl film	81	30	Robby Biggs MD  03/01/2017	03/13/2017	lyrica 75 mg capsule	63	21	Shanae Molina F NP  03/01/2017	03/01/2017	suboxone 2 mg-0.5 mg sl film	81	30	Robby Biggs MD Chief Complaint: post operative pain    HPI: This is a 58 yo female who has a PMH of AVN, bilateral hip replacements OA who is now POD #0 s/p total hysterectomy robotic, BSO, cysto, pelvic washings.  History of suboxone usage for drug abuse about 5 years ago. Her last dose was this morning. Patient seen and examined at bedside in PACU. Patient is reporting that her pain is not well controlled with current regimen. Received 5 mg IV dilaudid over the last hour without relief of her pain. Pain is 8/10 at this time. She is reporting pain is constant and severe across the abdomen. Pain is pressure like and cramping. Pain is worsened with movements. Denies and side effects from medications. Tolerating full diet.       PAST MEDICAL & SURGICAL HISTORY:  Gene mutation: + check 2 gene mutation  BRCA2 positive  AVN (avascular necrosis of bone)  OA (osteoarthritis)  Chronic pain  S/P hip replacement, bilateral      FAMILY HISTORY:  Family history of ovarian cancer (Mother)  Family history of breast cancer (Mother)      SOCIAL HISTORY:  [ ] Denies Smoking, Alcohol, or Drug Use    Allergies    No Known Allergies    Intolerances        PAIN MEDICATIONS:  acetaminophen   Tablet. 650 milliGRAM(s) Oral every 6 hours PRN  DULoxetine 60 milliGRAM(s) Oral daily  ketorolac   Injectable 15 milliGRAM(s) IV Push every 6 hours PRN  methocarbamol 500 milliGRAM(s) Oral every 8 hours PRN  ondansetron Injectable 4 milliGRAM(s) IV Push every 6 hours PRN  ondansetron Injectable 4 milliGRAM(s) IV Push once PRN  pregabalin 150 milliGRAM(s) Oral daily  pregabalin 75 milliGRAM(s) Oral at bedtime  promethazine IVPB 6.25 milliGRAM(s) IV Intermittent once PRN  QUEtiapine 25 milliGRAM(s) Oral daily    Heme:  enoxaparin Injectable 40 milliGRAM(s) SubCutaneous once    Antibiotics:  cefoTEtan  IVPB 1 Gram(s) IV Intermittent once    Cardiovascular:    GI:    Endocrine:    All Other Medications:  dextrose 5% + sodium chloride 0.45% with potassium chloride 20 mEq/L 1000 milliLiter(s) IV Continuous <Continuous>  lactated ringers. 1000 milliLiter(s) IV Continuous <Continuous>      REVIEW OF SYSTEMS:    CONSTITUTIONAL: No fever, weight loss, or fatigue  EYES: No eye pain, visual disturbances, or discharge  ENMT:  No difficulty hearing, tinnitus, vertigo; No sinus or throat pain  NECK: No pain or stiffness  RESPIRATORY: No cough, wheezing, chills or hemoptysis; No shortness of breath  CARDIOVASCULAR: No chest pain, palpitations, dizziness, or leg swelling  GASTROINTESTINAL: + post surgical abdominal or epigastric pain. No nausea, vomiting, or hematemesis; No diarrhea or constipation. No melena or hematochezia.  GENITOURINARY: No dysuria, frequency, hematuria, or incontinence  NEUROLOGICAL: No headaches, memory loss, loss of strength, numbness, or tremors  SKIN: No itching, burning, rashes, or lesions   LYMPH NODES: No enlarged glands  ENDOCRINE: No heat or cold intolerance; No hair loss  MUSCULOSKELETAL: No joint pain or swelling; No muscle, back, or extremity pain  PSYCHIATRIC: No depression, anxiety, mood swings, or difficulty sleeping  HEME/LYMPH: No easy bruising, or bleeding gums  ALLERY AND IMMUNOLOGIC: No hives or eczema      Vital Signs Last 24 Hrs  T(C): 36.2 (27 Feb 2018 12:16), Max: 36.4 (27 Feb 2018 08:16)  T(F): 97.2 (27 Feb 2018 12:16), Max: 97.5 (27 Feb 2018 08:16)  HR: 65 (27 Feb 2018 13:30) (65 - 88)  BP: 113/76 (27 Feb 2018 13:30) (11/60 - 133/94)  BP(mean): --  RR: 15 (27 Feb 2018 13:30) (14 - 18)  SpO2: 100% (27 Feb 2018 13:30) (94% - 100%)    PAIN SCORE:     8    SCALE USED: (1-10 VNRS)             PHYSICAL EXAM:    GENERAL: NAD, well-groomed, well-developed  HEAD:  Atraumatic, Normocephalic  EYES: EOMI, PERRLA, conjunctiva and sclera clear  ENMT: No tonsillar erythema, exudates, or enlargement; Moist mucous membranes, Good dentition, No lesions  NECK: Supple, No JVD, Normal thyroid  NERVOUS SYSTEM:  Alert & Oriented X3, Good concentration; Motor Strength 5/5 B/L upper and lower extremities; DTRs 2+ intact and symmetric  CHEST/LUNG: Clear to percussion bilaterally; No rales, rhonchi, wheezing, or rubs  HEART: Regular rate and rhythm; No murmurs, rubs, or gallops  ABDOMEN: Soft, Nontender, Nondistended; Bowel sounds present, dressing clean, dry, intact  EXTREMITIES:  2+ Peripheral Pulses, No clubbing, cyanosis, or edema  LYMPH: No lymphadenopathy noted  SKIN: No rashes or lesions        LABS:                  RADIOLOGY:    Drug Screen:            [X ]  [place]  Reviewed and Copied to Chart      Search Terms: joselito rosales, 1960 Search Date: 02/27/2018 01:52:19 PM  The Drug Utilization Report below displays all of the controlled substance prescriptions, if any, that your patient has filled in the last twelve months. The information displayed on this report is compiled from pharmacy submissions to the Department, and accurately reflects the information as submitted by the pharmacies.      Others' Prescriptions  Patient Name:	Joselito Rosales	YOB: 1960  Address:	[address] [place] [address]	Sex:	Female  Rx Written	Rx Dispensed	Drug	Quantity	Days Supply	Prescriber Name  01/19/2018	02/10/2018	lyrica 75 mg capsule	63	21	Robby Biggs MD  01/24/2018	01/31/2018	suboxone 2 mg-0.5 mg sl film	82	30	Shanae Molina KRISTEN  01/17/2018	01/20/2018	lyrica 75 mg capsule	63	21	JesseShanae KRISTEN  01/03/2018	01/03/2018	suboxone 2 mg-0.5 mg sl film	82	30	Shanae Molina KRISTEN  12/05/2017	12/30/2017	lyrica 75 mg capsule	63	21	Jesse Shanae OLIVER KRISTEN  12/05/2017	12/12/2017	lyrica 75 mg capsule	63	21	Jesse Shanae OLIVER NP  12/05/2017	12/05/2017	suboxone 2 mg-0.5 mg sl film	82	30	Shanae Molina NP  11/21/2017	11/22/2017	lyrica 75 mg capsule	63	21	Jesse Shanae OLIVER KRISTEN  11/08/2017	11/08/2017	suboxone 2 mg-0.5 mg sl film	82	30	Jesse Shanae OLIVER KRISTEN  10/11/2017	11/02/2017	lyrica 75 mg capsule	63	21	Jesse Shanae OLIVER KRISTEN  10/11/2017	10/11/2017	lyrica 75 mg capsule	63	21	Shanae Molina NP  10/11/2017	10/11/2017	suboxone 2 mg-0.5 mg sl film	82	30	Shanae Molina NP  09/22/2017	09/25/2017	lyrica 75 mg capsule	63	21	Robby Biggs MD  09/13/2017	09/13/2017	suboxone 2 mg-0.5 mg sl film	82	30	Shanae Molina NP  08/16/2017	09/06/2017	lyrica 75 mg capsule	63	21	Shanae Molina F NP  08/16/2017	08/18/2017	lyrica 75 mg capsule	63	21	Shanae Molina F NP  08/16/2017	08/18/2017	suboxone 2 mg-0.5 mg sl film	82	30	Robby Biggs MD  07/05/2017	07/27/2017	lyrica 75 mg capsule	63	21	Shanae Molina NP  07/19/2017	07/21/2017	suboxone 2 mg-0.5 mg sl film	82	30	Shanae Molina NP  07/05/2017	07/08/2017	lyrica 75 mg capsule	63	21	Shanae Molina F NP  06/21/2017	06/24/2017	suboxone 2 mg-0.5 mg sl film	82	30	Shanae Molina F NP  05/24/2017	06/19/2017	lyrica 75 mg capsule	63	21	Shanae Molina F NP  05/24/2017	05/30/2017	lyrica 75 mg capsule	63	21	Shanae Molina F NP  05/24/2017	05/26/2017	suboxone 2 mg-0.5 mg sl film	82	30	Shanae Molina F NP  05/09/2017	05/10/2017	lyrica 75 mg capsule	63	21	Shanae Molina F NP  04/26/2017	04/29/2017	suboxone 2 mg-0.5 mg sl film	82	30	Shanae Molina F NP  03/29/2017	04/20/2017	lyrica 75 mg capsule	63	21	Shanae Molina F NP  03/29/2017	04/01/2017	lyrica 75 mg capsule	63	21	Shanae Molina F NP  03/29/2017	03/29/2017	suboxone 2 mg-0.5 mg sl film	81	30	Robby Biggs MD  03/01/2017	03/13/2017	lyrica 75 mg capsule	63	21	Shanae Molina NP  03/01/2017	03/01/2017	suboxone 2 mg-0.5 mg sl film	81	30	Robby Biggs MD

## 2018-02-27 NOTE — CONSULT NOTE ADULT - PROBLEM SELECTOR RECOMMENDATION 9
1. Patient is reporting pain is mildly controlled with regimen  2. Will recommend non narcotic analgesics instead  3. Meds     - stop IV dilaudid and stop iv fentanyl      - change toradol to 15 mg IV q 6 prn severe pain (nurse advised to hold further dosage until 6 hours from prior dosage)      - start tylenol 650 mg q 6 prn mild pain (nurse advised to hold further dosage until 8 hours after prior dosage)      - continue lyrica 150 mg qd and 75 mg qhs as ordered      - continue cymbalta as ordered      - add robaxin 500 mg q 8 prn muscle spasms/abdominal cramping spasms      - patient was advised that she needs to give the medications time to work.       - if not effective can consider increasing robaxin to 750 mg q 8 prn instead and consider increasing lyrica to 150 mg bid if needed instead.   3. bowel regimen as indicated per primary team  4. we will continue to monitor  5. thank you for the consult  6. discussed plan in full with Dr Wilkins and PA from Dr Martins's practice  7. call with questions

## 2018-02-27 NOTE — PROGRESS NOTE ADULT - SUBJECTIVE AND OBJECTIVE BOX
GYNECOLOGIC ONCOLOGY PROGRESS NOTE    POD#0    PROBLEMS:  History of substance abuse  Post-operative pain      Pt seen and examined at bedside in PACU, resting comfortably.     SUBJECTIVE:    Patient is without complaints.  Pain well-controlled.  Denies Nausea, Vomiting or Diarrhea.  Denies shortness of breath, chest pain or dyspnea on exertion.  Tolerating diet.    OBJECTIVE:     VITALS:  T(F): 98 (02-27-18 @ 14:30), Max: 98 (02-27-18 @ 14:30)  HR: 64 (02-27-18 @ 14:30) (64 - 88)  BP: 132/91 (02-27-18 @ 14:30) (11/60 - 133/94)  RR: 15 (02-27-18 @ 14:30) (14 - 18)  SpO2: 100% (02-27-18 @ 14:30) (94% - 100%)    I&O's Summary  Pt. voiding    MEDICATIONS  (STANDING):  buprenorphine 2 mG/naloxone 0.5 mG SL  Tablet 1 Tablet(s) SubLingual at bedtime  cefoTEtan  IVPB 1 Gram(s) IV Intermittent once  dextrose 5% + sodium chloride 0.45% with potassium chloride 20 mEq/L 1000 milliLiter(s) (125 mL/Hr) IV Continuous <Continuous>  DULoxetine 60 milliGRAM(s) Oral daily  enoxaparin Injectable 40 milliGRAM(s) SubCutaneous once  lactated ringers. 1000 milliLiter(s) (125 mL/Hr) IV Continuous <Continuous>  pregabalin 150 milliGRAM(s) Oral daily  pregabalin 75 milliGRAM(s) Oral at bedtime  QUEtiapine 25 milliGRAM(s) Oral daily    MEDICATIONS  (PRN):  acetaminophen   Tablet. 650 milliGRAM(s) Oral every 6 hours PRN Mild Pain (1 - 3)  ketorolac   Injectable 15 milliGRAM(s) IV Push every 6 hours PRN Severe Pain (7 - 10)  methocarbamol 500 milliGRAM(s) Oral every 8 hours PRN muscle spasms/incisional spasms  ondansetron Injectable 4 milliGRAM(s) IV Push every 6 hours PRN Postoperative Nausea and/or Vomiting  ondansetron Injectable 4 milliGRAM(s) IV Push once PRN Nausea and/or Vomiting  promethazine IVPB 6.25 milliGRAM(s) IV Intermittent once PRN Nausea and/or Vomiting      Physical Exam:  Constitutional: NAD  Pulmonary: clear to auscultation bilaterally   Cardiovascular: Regular rate and rhythm   Abdomen: soft, non-tender, non-distended, normal bowel sounds  Extremities: no lower extremity edema or calve tenderness, Chely's sign negative.  Incision: Clean, dry, intact.  Without signs of infection or hernia. GYNECOLOGIC ONCOLOGY PROGRESS NOTE    POD#0    PROBLEMS:  History of substance abuse  Post-operative pain      Pt seen and examined at bedside in PACU, resting comfortably. Complains of pelvic pressure and post operative abdominal tenderness as expected.     SUBJECTIVE:    Pain well-controlled.  Denies Nausea, Vomiting or Diarrhea.  Denies shortness of breath, chest pain or dyspnea on exertion.  Tolerating liquids.    OBJECTIVE:     VITALS:  T(F): 98 (02-27-18 @ 14:30), Max: 98 (02-27-18 @ 14:30)  HR: 64 (02-27-18 @ 14:30) (64 - 88)  BP: 132/91 (02-27-18 @ 14:30) (11/60 - 133/94)  RR: 15 (02-27-18 @ 14:30) (14 - 18)  SpO2: 100% (02-27-18 @ 14:30) (94% - 100%)    I&O's Summary  Pt. voiding    MEDICATIONS  (STANDING):  buprenorphine 2 mG/naloxone 0.5 mG SL  Tablet 1 Tablet(s) SubLingual at bedtime  cefoTEtan  IVPB 1 Gram(s) IV Intermittent once  dextrose 5% + sodium chloride 0.45% with potassium chloride 20 mEq/L 1000 milliLiter(s) (125 mL/Hr) IV Continuous <Continuous>  DULoxetine 60 milliGRAM(s) Oral daily  enoxaparin Injectable 40 milliGRAM(s) SubCutaneous once  lactated ringers. 1000 milliLiter(s) (125 mL/Hr) IV Continuous <Continuous>  pregabalin 150 milliGRAM(s) Oral daily  pregabalin 75 milliGRAM(s) Oral at bedtime  QUEtiapine 25 milliGRAM(s) Oral daily    MEDICATIONS  (PRN):  acetaminophen   Tablet. 650 milliGRAM(s) Oral every 6 hours PRN Mild Pain (1 - 3)  ketorolac   Injectable 15 milliGRAM(s) IV Push every 6 hours PRN Severe Pain (7 - 10)  methocarbamol 500 milliGRAM(s) Oral every 8 hours PRN muscle spasms/incisional spasms  ondansetron Injectable 4 milliGRAM(s) IV Push every 6 hours PRN Postoperative Nausea and/or Vomiting  ondansetron Injectable 4 milliGRAM(s) IV Push once PRN Nausea and/or Vomiting  promethazine IVPB 6.25 milliGRAM(s) IV Intermittent once PRN Nausea and/or Vomiting      Physical Exam:  Constitutional: NAD  Pulmonary: clear to auscultation bilaterally   Cardiovascular: Regular rate and rhythm   Abdomen: soft, non-tender, non-distended, hypoactive bowel sounds  Extremities: no lower extremity edema or calve tenderness, Chely's sign negative.  Incision: dressings clean, dry, intact.  Without signs of infection or hernia.

## 2018-02-27 NOTE — CONSULT NOTE ADULT - PROBLEM SELECTOR RECOMMENDATION 2
1. Patient with a 5 year history of substance abuse s/p bilateral hip replacements. Now managed as outpatient on suboxone 2mg- 0.5 mg sl film two times a day, twice in the morning and once at night. Last dosage was this morning.  2. Spoke to Dr Wilkins and he is recommending that we try and avoid opioid medications at this time as suboxone is still in her system working as an antagonist against the opioids, hence her lack of relief from high dose opioid analgesics. Suboxone effects after stopped can remain in her system for a possible 48 hours post. Recommend that she restart her normal dosage tonight as she was getting as an outpatient  and to control her pain instead with non narcotic analgesics.  2. She is to follow up with addiction medicine as an outpatient for further suboxone treatment

## 2018-02-27 NOTE — CONSULT NOTE ADULT - ASSESSMENT
HPI: This is a 56 yo female who has a PMH of AVN, bilateral hip replacements OA who is now POD #0 s/p total hysterectomy robotic. History of suboxone usage for drug abuse about 5 years ago. Her last dose was this morning. Patient seen and examined at bedside in PACU. Patient is reporting that her pain is not well controlled with current regimen. Received 5 mg IV dilaudid over the last hour without relief of her pain. Pain is 8/10 at this time. She is reporting pain is constant and severe across the abdomen. Pain is pressure like and cramping. Pain is worsened with movements. Denies and side effects from medications. Tolerating full diet. HPI: This is a 56 yo female who has a PMH of AVN, bilateral hip replacements OA who is now POD #0 s/p total hysterectomy robotic, BSO, cysto, pelvic washings.  History of suboxone usage for drug abuse about 5 years ago. Her last dose was this morning. Patient seen and examined at bedside in PACU. Patient is reporting that her pain is not well controlled with current regimen. Received 5 mg IV dilaudid over the last hour without relief of her pain. Pain is 8/10 at this time. She is reporting pain is constant and severe across the abdomen. Pain is pressure like and cramping. Pain is worsened with movements. Denies and side effects from medications. Tolerating full diet.

## 2018-02-28 ENCOUNTER — TRANSCRIPTION ENCOUNTER (OUTPATIENT)
Age: 58
End: 2018-02-28

## 2018-02-28 LAB
ANION GAP SERPL CALC-SCNC: 10 MMOL/L — SIGNIFICANT CHANGE UP (ref 5–17)
BASOPHILS # BLD AUTO: 0 K/UL — SIGNIFICANT CHANGE UP (ref 0–0.2)
BASOPHILS NFR BLD AUTO: 0.1 % — SIGNIFICANT CHANGE UP (ref 0–2)
BUN SERPL-MCNC: 19 MG/DL — SIGNIFICANT CHANGE UP (ref 8–20)
CALCIUM SERPL-MCNC: 8.7 MG/DL — SIGNIFICANT CHANGE UP (ref 8.6–10.2)
CHLORIDE SERPL-SCNC: 102 MMOL/L — SIGNIFICANT CHANGE UP (ref 98–107)
CO2 SERPL-SCNC: 25 MMOL/L — SIGNIFICANT CHANGE UP (ref 22–29)
CREAT SERPL-MCNC: 0.66 MG/DL — SIGNIFICANT CHANGE UP (ref 0.5–1.3)
EOSINOPHIL # BLD AUTO: 0 K/UL — SIGNIFICANT CHANGE UP (ref 0–0.5)
EOSINOPHIL NFR BLD AUTO: 0 % — SIGNIFICANT CHANGE UP (ref 0–6)
GLUCOSE SERPL-MCNC: 130 MG/DL — HIGH (ref 70–115)
HCT VFR BLD CALC: 38.8 % — SIGNIFICANT CHANGE UP (ref 37–47)
HGB BLD-MCNC: 12.8 G/DL — SIGNIFICANT CHANGE UP (ref 12–16)
LYMPHOCYTES # BLD AUTO: 1.5 K/UL — SIGNIFICANT CHANGE UP (ref 1–4.8)
LYMPHOCYTES # BLD AUTO: 15.9 % — LOW (ref 20–55)
MAGNESIUM SERPL-MCNC: 2.4 MG/DL — SIGNIFICANT CHANGE UP (ref 1.6–2.6)
MCHC RBC-ENTMCNC: 28.1 PG — SIGNIFICANT CHANGE UP (ref 27–31)
MCHC RBC-ENTMCNC: 33 G/DL — SIGNIFICANT CHANGE UP (ref 32–36)
MCV RBC AUTO: 85.1 FL — SIGNIFICANT CHANGE UP (ref 81–99)
MONOCYTES # BLD AUTO: 0.8 K/UL — SIGNIFICANT CHANGE UP (ref 0–0.8)
MONOCYTES NFR BLD AUTO: 8.9 % — SIGNIFICANT CHANGE UP (ref 3–10)
NEUTROPHILS # BLD AUTO: 7.1 K/UL — SIGNIFICANT CHANGE UP (ref 1.8–8)
NEUTROPHILS NFR BLD AUTO: 74.8 % — HIGH (ref 37–73)
PHOSPHATE SERPL-MCNC: 2.6 MG/DL — SIGNIFICANT CHANGE UP (ref 2.4–4.7)
PLATELET # BLD AUTO: 241 K/UL — SIGNIFICANT CHANGE UP (ref 150–400)
POTASSIUM SERPL-MCNC: 4.6 MMOL/L — SIGNIFICANT CHANGE UP (ref 3.5–5.3)
POTASSIUM SERPL-SCNC: 4.6 MMOL/L — SIGNIFICANT CHANGE UP (ref 3.5–5.3)
RBC # BLD: 4.56 M/UL — SIGNIFICANT CHANGE UP (ref 4.4–5.2)
RBC # FLD: 12.9 % — SIGNIFICANT CHANGE UP (ref 11–15.6)
SODIUM SERPL-SCNC: 137 MMOL/L — SIGNIFICANT CHANGE UP (ref 135–145)
WBC # BLD: 9.5 K/UL — SIGNIFICANT CHANGE UP (ref 4.8–10.8)
WBC # FLD AUTO: 9.5 K/UL — SIGNIFICANT CHANGE UP (ref 4.8–10.8)

## 2018-02-28 RX ORDER — ACETAMINOPHEN 500 MG
1000 TABLET ORAL ONCE
Qty: 0 | Refills: 0 | Status: COMPLETED | OUTPATIENT
Start: 2018-02-28 | End: 2018-02-28

## 2018-02-28 RX ORDER — KETOROLAC TROMETHAMINE 30 MG/ML
30 SYRINGE (ML) INJECTION ONCE
Qty: 0 | Refills: 0 | Status: DISCONTINUED | OUTPATIENT
Start: 2018-02-28 | End: 2018-02-28

## 2018-02-28 RX ORDER — DIPHENHYDRAMINE HCL 50 MG
25 CAPSULE ORAL EVERY 4 HOURS
Qty: 0 | Refills: 0 | Status: DISCONTINUED | OUTPATIENT
Start: 2018-02-28 | End: 2018-03-01

## 2018-02-28 RX ORDER — DEXAMETHASONE 0.5 MG/5ML
6 ELIXIR ORAL ONCE
Qty: 0 | Refills: 0 | Status: COMPLETED | OUTPATIENT
Start: 2018-02-28 | End: 2018-02-28

## 2018-02-28 RX ORDER — LIDOCAINE 4 G/100G
2 CREAM TOPICAL DAILY
Qty: 0 | Refills: 0 | Status: DISCONTINUED | OUTPATIENT
Start: 2018-02-28 | End: 2018-03-01

## 2018-02-28 RX ORDER — ACETAMINOPHEN 500 MG
975 TABLET ORAL EVERY 6 HOURS
Qty: 0 | Refills: 0 | Status: DISCONTINUED | OUTPATIENT
Start: 2018-02-28 | End: 2018-03-01

## 2018-02-28 RX ORDER — KETOROLAC TROMETHAMINE 30 MG/ML
30 SYRINGE (ML) INJECTION EVERY 6 HOURS
Qty: 0 | Refills: 0 | Status: DISCONTINUED | OUTPATIENT
Start: 2018-02-28 | End: 2018-03-01

## 2018-02-28 RX ORDER — METHOCARBAMOL 500 MG/1
750 TABLET, FILM COATED ORAL EVERY 6 HOURS
Qty: 0 | Refills: 0 | Status: DISCONTINUED | OUTPATIENT
Start: 2018-02-28 | End: 2018-03-01

## 2018-02-28 RX ADMIN — Medication 650 MILLIGRAM(S): at 02:50

## 2018-02-28 RX ADMIN — Medication 15 MILLIGRAM(S): at 05:20

## 2018-02-28 RX ADMIN — Medication 400 MILLIGRAM(S): at 12:09

## 2018-02-28 RX ADMIN — DULOXETINE HYDROCHLORIDE 60 MILLIGRAM(S): 30 CAPSULE, DELAYED RELEASE ORAL at 22:08

## 2018-02-28 RX ADMIN — Medication 15 MILLIGRAM(S): at 04:59

## 2018-02-28 RX ADMIN — Medication 30 MILLIGRAM(S): at 23:27

## 2018-02-28 RX ADMIN — Medication 30 MILLIGRAM(S): at 09:49

## 2018-02-28 RX ADMIN — Medication 30 MILLIGRAM(S): at 10:50

## 2018-02-28 RX ADMIN — Medication 30 MILLIGRAM(S): at 23:45

## 2018-02-28 RX ADMIN — BUPRENORPHINE AND NALOXONE 1 TABLET(S): 2; .5 TABLET SUBLINGUAL at 22:10

## 2018-02-28 RX ADMIN — QUETIAPINE FUMARATE 25 MILLIGRAM(S): 200 TABLET, FILM COATED ORAL at 22:08

## 2018-02-28 RX ADMIN — Medication 30 MILLIGRAM(S): at 18:03

## 2018-02-28 RX ADMIN — BUPRENORPHINE AND NALOXONE 2 TABLET(S): 2; .5 TABLET SUBLINGUAL at 12:08

## 2018-02-28 RX ADMIN — METHOCARBAMOL 750 MILLIGRAM(S): 500 TABLET, FILM COATED ORAL at 17:19

## 2018-02-28 RX ADMIN — METHOCARBAMOL 750 MILLIGRAM(S): 500 TABLET, FILM COATED ORAL at 13:51

## 2018-02-28 RX ADMIN — METHOCARBAMOL 750 MILLIGRAM(S): 500 TABLET, FILM COATED ORAL at 23:27

## 2018-02-28 RX ADMIN — Medication 1000 MILLIGRAM(S): at 12:15

## 2018-02-28 RX ADMIN — BUPRENORPHINE AND NALOXONE 1 TABLET(S): 2; .5 TABLET SUBLINGUAL at 23:00

## 2018-02-28 RX ADMIN — Medication 150 MILLIGRAM(S): at 17:50

## 2018-02-28 RX ADMIN — Medication 6 MILLIGRAM(S): at 17:17

## 2018-02-28 RX ADMIN — Medication 650 MILLIGRAM(S): at 02:00

## 2018-02-28 RX ADMIN — LIDOCAINE 2 PATCH: 4 CREAM TOPICAL at 12:08

## 2018-02-28 RX ADMIN — BUPRENORPHINE AND NALOXONE 2 TABLET(S): 2; .5 TABLET SUBLINGUAL at 13:00

## 2018-02-28 RX ADMIN — Medication 30 MILLIGRAM(S): at 17:18

## 2018-02-28 NOTE — PROGRESS NOTE ADULT - SUBJECTIVE AND OBJECTIVE BOX
Pt seen and examined at bedside. This is a 56 yo female who has a PMH of AVN, bilateral hip replacements OA who is now POD #0 s/p total hysterectomy robotic, BSO, cysto, pelvic washings.  History of suboxone usage for drug abuse about 5 years ago.  She reports moderately controlled pain at this time; 4/10 on VNRS. Pain is located diffusely across the abdomen; cramping and "pressure like" in nature. She does report pain relief with current regimen; however, she is nervous about going home and having an exacerbation. She also reports LBP with intermittent radicular pain. No saddle anesthesia or bowel/bladder dysfunction. No side effects with current regimen. No Fever, chills, HA, CP, SOB, N/V.     VSS; afebrile  NAD, resting comfortably in bed  A&Ox3, PERRL  RRR, S1S2  CTABL  +BS  Surgical Site WDL  CORREA  OOB  Tolerating PO    A/P  56 y/o female with h/o opiate abuse, on Suboxone 6mg/day. now s/p hysterectomy SBO; POD #1  -Cont. Suboxone 6mg/day. To follow up with Dr. Robby Biggs upon d/c.  [phone]  -Ofirmev 1gm IV x1  -Ketorolac 30mg IV Q6h PRN  -Ketorolac 10mg PO Q8 x3 days d/c RX  -Increase Lyrica 150mg BID  -Increase: Robaxin 750mg Q6h  -Lidoderm 1-2 patches daily  -LBP: Consider Decadron 6mg IV x1 dose if OK with surgical team.   -Bowel Regimen per primary team

## 2018-02-28 NOTE — DISCHARGE NOTE ADULT - CARE PLAN
Principal Discharge DX:	Pelvic mass  Goal:	To Improve Care  Assessment and plan of treatment:	Please follow-up with PA in one week for post-op visit/removal of sutures.  Follow-up with Dr. Martins in two weeks to review pathology report. Principal Discharge DX:	Pelvic mass  Goal:	To Improve Care  Assessment and plan of treatment:	Please follow-up with PA in one week for post-op visit/removal of sutures.  Follow-up with Dr. aMrtins in two weeks to review pathology report.  Follow-up with your current PMD within one week of discharge  Follow-up with your current pain management doctor within one week of discharge Principal Discharge DX:	Pelvic mass  Goal:	To Improve Care  Assessment and plan of treatment:	Please follow-up with PA in one week for post-op visit/removal of sutures.  Follow-up with Dr. Martins in two weeks to review pathology report.  Follow-up with your current PMD/ pain management doctor within one week of discharge

## 2018-02-28 NOTE — PROGRESS NOTE ADULT - SUBJECTIVE AND OBJECTIVE BOX
GYNECOLOGIC ONCOLOGY PROGRESS NOTE    POD#1    PROBLEMS:  BRCA2 positive  History of substance abuse  Post-operative pain      Pt seen and examined at bedside. Complains of pain not being well controlled with current regimen, describes pain as a pressure feeling in pelvis radiating to bilateral hips.     SUBJECTIVE:    Flatus: Yes  Denies Nausea, Vomiting or Diarrhea.  Denies shortness of breath, chest pain or dyspnea on exertion.  Tolerating diet.    OBJECTIVE:     VITALS:  T(F): 98.5 (02-28-18 @ 08:41), Max: 98.8 (02-27-18 @ 20:45)  HR: 60 (02-28-18 @ 08:41) (60 - 82)  BP: 105/62 (02-28-18 @ 08:41) (94/55 - 133/94)  RR: 18 (02-28-18 @ 08:41) (14 - 18)  SpO2: 95% (02-28-18 @ 08:41) (92% - 100%)      I&O's Summary    Pt voided 950 mL overnight, blackwell removed post operatively yesterday.       MEDICATIONS  (STANDING):  buprenorphine 2 mG/naloxone 0.5 mG SL  Tablet 1 Tablet(s) SubLingual at bedtime  buprenorphine 2 mG/naloxone 0.5 mG SL  Tablet 2 Tablet(s) SubLingual daily  dextrose 5% + sodium chloride 0.45% with potassium chloride 20 mEq/L 1000 milliLiter(s) (125 mL/Hr) IV Continuous <Continuous>  DULoxetine 60 milliGRAM(s) Oral daily  enoxaparin Injectable 40 milliGRAM(s) SubCutaneous daily  pregabalin 150 milliGRAM(s) Oral daily  pregabalin 75 milliGRAM(s) Oral at bedtime  QUEtiapine 25 milliGRAM(s) Oral daily    MEDICATIONS  (PRN):  acetaminophen   Tablet. 650 milliGRAM(s) Oral every 6 hours PRN Mild Pain (1 - 3)  ketorolac   Injectable 15 milliGRAM(s) IV Push every 6 hours PRN Severe Pain (7 - 10)  methocarbamol 500 milliGRAM(s) Oral every 8 hours PRN muscle spasms/incisional spasms  ondansetron Injectable 4 milliGRAM(s) IV Push every 6 hours PRN Postoperative Nausea and/or Vomiting      Physical Exam:  Constitutional: NAD  Pulmonary: clear to auscultation bilaterally   Cardiovascular: Regular rate and rhythm   Abdomen: soft, non-tender, non-distended, normal bowel sounds  Extremities: no lower extremity edema or calve tenderness, Chely's sign negative.  Incision: Clean, dry, intact.  Without signs of infection or hernia.      LABS:                        12.8   9.5   )-----------( 241      ( 28 Feb 2018 07:19 )             38.8     02-28    137  |  102  |  19.0  ----------------------------<  130<H>  4.6   |  25.0  |  0.66    Ca    8.7      28 Feb 2018 07:19  Phos  2.6     02-28  Mg     2.4     02-28

## 2018-02-28 NOTE — PROGRESS NOTE ADULT - PROBLEM SELECTOR PLAN 1
s/p above procedure prophylactically   - Give 30mg IV Toradol x 1 Now in place of 15mg ordered - discussed with pain management, to come re-evaluate the patient and give d/c recommendations   - continue regular diet as tolerated  - OOB as tolerated   - SCDs and Lovenox for DVT prophylaxis   - Encourage incentive spirometer use  - Discharge planning to home when pain better controlled.    Above plan discussed with Dr. Martins

## 2018-02-28 NOTE — DISCHARGE NOTE ADULT - CARE PROVIDER_API CALL
Jake Martins), Gynecologic Oncology; Obstetrics and Gynecology  [address] [place] [address]  Phone: [phone]  Fax: [phone]

## 2018-02-28 NOTE — DISCHARGE NOTE ADULT - PATIENT PORTAL LINK FT
You can access the [facility] Patient Portal, offered by [facility], by registering with the following website: [email or web address]

## 2018-02-28 NOTE — DISCHARGE NOTE ADULT - HOSPITAL COURSE
58 yo s/p RA [facility] BSO cystoscopy. Patient with history of drug abuse and was found to have uncontrolled pain on pod#1. She was seen and followed by pain management during hospital stay and is now well controlled with changes in her regimen. Otherwise patient post-operatively had an uncomplicated hospital course. She is tolerating a regular diet. She is ambulating independently. She was able to void after removal of blackwell. Labs and Vitals WNL upon discharge. Patient instructed to follow-up with Lakshmi Singh her PMD and pain management group. 58 yo s/p RA [facility] BSO cystoscopy. Patient with history of drug abuse and was found to have uncontrolled pain on pod#1. She was seen and followed by pain management during hospital stay and is now well controlled with changes in her regimen. Otherwise patient post-operatively had an uncomplicated hospital course. She is tolerating a regular diet. She is ambulating independently. She was able to void after removal of blackwell. Labs and Vitals WNL upon discharge. Patient instructed to follow-up with Lakshmi Singh as well as her PMD/pain management group.

## 2018-02-28 NOTE — DISCHARGE NOTE ADULT - MEDICATION SUMMARY - MEDICATIONS TO STOP TAKING
I will STOP taking the medications listed below when I get home from the hospital:    Lyrica 75 mg oral capsule  -- 2cap(s) by mouth in am,  1 cap in the PM    Suboxone 2 mg-0.5 mg sublingual film  -- 2 each under tongue once a day

## 2018-02-28 NOTE — PROGRESS NOTE ADULT - SUBJECTIVE AND OBJECTIVE BOX
56 yo s/p RA [facility] BSO cystoscopy for BRCA mutation, POD#1. Patient with hx of drug abuse, currently being followed by pain management. Pain regimen adjusted this morning for uncontrolled pain overnight. Patient appears to be much more comfortable this afternoon, however she requests to stay one more night to assure new regimen will be adequate. Otherwise patient recovering well, admits to tolerating a regular diet and ambulating the halls. She continues SCD's and Lovenox for DVT prophylaxis. Discharge disposition for tomorrow AM. 56 yo s/p RA [facility] BSO cystoscopy for BRCA mutation, POD#1. Patient currently being followed by pain management for hx of drug abuse. Pain regimen adjusted this morning for uncontrolled pain overnight. She appears to be much more comfortable this afternoon, however she requests to stay one more night to assure new regimen will be adequate. Otherwise patient recovering well, admits to tolerating a regular diet and ambulating the halls. She continues SCD's and Lovenox for DVT prophylaxis. Discharge disposition for tomorrow AM.

## 2018-02-28 NOTE — DISCHARGE NOTE ADULT - ADDITIONAL INSTRUCTIONS
Please contact your provider for any pain uncontrolled by medication, excessive bleeding or Fever>100.4  Please take Naprosyn tablet every 12 hours x 3 days, may take percocet as prescribed for breakthrough pain. Please contact your provider for any pain uncontrolled by medication, excessive bleeding or Fever>100.4

## 2018-02-28 NOTE — DISCHARGE NOTE ADULT - MEDICATION SUMMARY - MEDICATIONS TO TAKE
I will START or STAY ON the medications listed below when I get home from the hospital:    ketorolac 10 mg oral tablet  -- 1 tab(s) by mouth 3 times a day   -- It is very important that you take or use this exactly as directed.  Do not skip doses or discontinue unless directed by your doctor.  May cause drowsiness or dizziness.  Obtain medical advice before taking any non-prescription drugs as some may affect the action of this medication.  Take with food or milk.    -- Indication: For Pain    buprenorphine-naloxone 2 mg-0.5 mg sublingual tablet  -- 1  under tongue once a day (at bedtime) MDD:1 tablet  -- Indication: For Pain    buprenorphine-naloxone 2 mg-0.5 mg sublingual film  -- 2 each under tongue once a day MDD:2  -- Indication: For Pain    pregabalin 150 mg oral capsule  -- 1 cap(s) by mouth 2 times a day MDD:2 tabs  -- Indication: For Per PMD    Cymbalta 60 mg oral delayed release capsule  -- 1 cap(s) by mouth once a day  -- Indication: For Per PMD    SEROquel 25 mg oral tablet  -- 1 tab(s) by mouth once a day (at bedtime)  -- Indication: For Per PMD    lidocaine 5% topical film  -- Apply on skin to affected area once a day   -- Indication: For Pain    methocarbamol 750 mg oral tablet  -- 1 tab(s) by mouth every 6 hours  -- Indication: For Pain

## 2018-02-28 NOTE — DISCHARGE NOTE ADULT - PLAN OF CARE
To Improve Care Please follow-up with PA in one week for post-op visit/removal of sutures.  Follow-up with Dr. Martins in two weeks to review pathology report. Please follow-up with PA in one week for post-op visit/removal of sutures.  Follow-up with Dr. Martins in two weeks to review pathology report.  Follow-up with your current PMD within one week of discharge  Follow-up with your current pain management doctor within one week of discharge Please follow-up with PA in one week for post-op visit/removal of sutures.  Follow-up with Dr. Martins in two weeks to review pathology report.  Follow-up with your current PMD/ pain management doctor within one week of discharge

## 2018-02-28 NOTE — DISCHARGE NOTE ADULT - INSTRUCTIONS
Continue current regular diet.    May walk and climb stairs as often as you would like, no vigorous activity, do not lift anything greater than 10lbs, nothing per vagina x 6 weeks, do not drive while on pain medication.

## 2018-02-28 NOTE — DISCHARGE NOTE ADULT - REASON FOR ADMISSION
Robot assisted total laparoscopic hysterectomy, bilateral salpingo-oophorectomy, cystoscopy for pelvic mass.

## 2018-03-01 ENCOUNTER — APPOINTMENT (OUTPATIENT)
Dept: OBGYN | Facility: CLINIC | Age: 58
End: 2018-03-01

## 2018-03-01 VITALS
HEART RATE: 57 BPM | RESPIRATION RATE: 18 BRPM | SYSTOLIC BLOOD PRESSURE: 133 MMHG | OXYGEN SATURATION: 95 % | DIASTOLIC BLOOD PRESSURE: 76 MMHG | TEMPERATURE: 98 F

## 2018-03-01 LAB
ANION GAP SERPL CALC-SCNC: 11 MMOL/L — SIGNIFICANT CHANGE UP (ref 5–17)
BASOPHILS # BLD AUTO: 0 K/UL — SIGNIFICANT CHANGE UP (ref 0–0.2)
BASOPHILS NFR BLD AUTO: 0.3 % — SIGNIFICANT CHANGE UP (ref 0–2)
BUN SERPL-MCNC: 21 MG/DL — HIGH (ref 8–20)
CALCIUM SERPL-MCNC: 8.8 MG/DL — SIGNIFICANT CHANGE UP (ref 8.6–10.2)
CHLORIDE SERPL-SCNC: 101 MMOL/L — SIGNIFICANT CHANGE UP (ref 98–107)
CO2 SERPL-SCNC: 27 MMOL/L — SIGNIFICANT CHANGE UP (ref 22–29)
CREAT SERPL-MCNC: 0.63 MG/DL — SIGNIFICANT CHANGE UP (ref 0.5–1.3)
EOSINOPHIL # BLD AUTO: 0 K/UL — SIGNIFICANT CHANGE UP (ref 0–0.5)
EOSINOPHIL NFR BLD AUTO: 0.3 % — SIGNIFICANT CHANGE UP (ref 0–6)
GLUCOSE SERPL-MCNC: 111 MG/DL — SIGNIFICANT CHANGE UP (ref 70–115)
HCT VFR BLD CALC: 38.9 % — SIGNIFICANT CHANGE UP (ref 37–47)
HGB BLD-MCNC: 12.8 G/DL — SIGNIFICANT CHANGE UP (ref 12–16)
LYMPHOCYTES # BLD AUTO: 2 K/UL — SIGNIFICANT CHANGE UP (ref 1–4.8)
LYMPHOCYTES # BLD AUTO: 29.4 % — SIGNIFICANT CHANGE UP (ref 20–55)
MCHC RBC-ENTMCNC: 28.1 PG — SIGNIFICANT CHANGE UP (ref 27–31)
MCHC RBC-ENTMCNC: 32.9 G/DL — SIGNIFICANT CHANGE UP (ref 32–36)
MCV RBC AUTO: 85.5 FL — SIGNIFICANT CHANGE UP (ref 81–99)
MONOCYTES # BLD AUTO: 0.6 K/UL — SIGNIFICANT CHANGE UP (ref 0–0.8)
MONOCYTES NFR BLD AUTO: 9.2 % — SIGNIFICANT CHANGE UP (ref 3–10)
NEUTROPHILS # BLD AUTO: 4.1 K/UL — SIGNIFICANT CHANGE UP (ref 1.8–8)
NEUTROPHILS NFR BLD AUTO: 60.7 % — SIGNIFICANT CHANGE UP (ref 37–73)
PLATELET # BLD AUTO: 220 K/UL — SIGNIFICANT CHANGE UP (ref 150–400)
POTASSIUM SERPL-MCNC: 4.1 MMOL/L — SIGNIFICANT CHANGE UP (ref 3.5–5.3)
POTASSIUM SERPL-SCNC: 4.1 MMOL/L — SIGNIFICANT CHANGE UP (ref 3.5–5.3)
RBC # BLD: 4.55 M/UL — SIGNIFICANT CHANGE UP (ref 4.4–5.2)
RBC # FLD: 12.7 % — SIGNIFICANT CHANGE UP (ref 11–15.6)
SODIUM SERPL-SCNC: 139 MMOL/L — SIGNIFICANT CHANGE UP (ref 135–145)
WBC # BLD: 6.7 K/UL — SIGNIFICANT CHANGE UP (ref 4.8–10.8)
WBC # FLD AUTO: 6.7 K/UL — SIGNIFICANT CHANGE UP (ref 4.8–10.8)

## 2018-03-01 PROCEDURE — 88305 TISSUE EXAM BY PATHOLOGIST: CPT

## 2018-03-01 PROCEDURE — 86850 RBC ANTIBODY SCREEN: CPT

## 2018-03-01 PROCEDURE — 86900 BLOOD TYPING SEROLOGIC ABO: CPT

## 2018-03-01 PROCEDURE — S2900: CPT

## 2018-03-01 PROCEDURE — 36415 COLL VENOUS BLD VENIPUNCTURE: CPT

## 2018-03-01 PROCEDURE — 86901 BLOOD TYPING SEROLOGIC RH(D): CPT

## 2018-03-01 PROCEDURE — 82962 GLUCOSE BLOOD TEST: CPT

## 2018-03-01 PROCEDURE — 85027 COMPLETE CBC AUTOMATED: CPT

## 2018-03-01 PROCEDURE — 84100 ASSAY OF PHOSPHORUS: CPT

## 2018-03-01 PROCEDURE — 88307 TISSUE EXAM BY PATHOLOGIST: CPT

## 2018-03-01 PROCEDURE — 80048 BASIC METABOLIC PNL TOTAL CA: CPT

## 2018-03-01 PROCEDURE — 83735 ASSAY OF MAGNESIUM: CPT

## 2018-03-01 RX ORDER — LIDOCAINE 4 G/100G
2 CREAM TOPICAL
Qty: 10 | Refills: 0 | OUTPATIENT
Start: 2018-03-01 | End: 2018-03-05

## 2018-03-01 RX ORDER — METHOCARBAMOL 500 MG/1
1 TABLET, FILM COATED ORAL
Qty: 20 | Refills: 0 | OUTPATIENT
Start: 2018-03-01 | End: 2018-03-05

## 2018-03-01 RX ORDER — BUPRENORPHINE AND NALOXONE 2; .5 MG/1; MG/1
3 TABLET SUBLINGUAL
Qty: 15 | Refills: 0 | OUTPATIENT
Start: 2018-03-01 | End: 2018-03-05

## 2018-03-01 RX ORDER — BUPRENORPHINE AND NALOXONE 2; .5 MG/1; MG/1
2 TABLET SUBLINGUAL
Qty: 10 | Refills: 0 | OUTPATIENT
Start: 2018-03-01 | End: 2018-03-05

## 2018-03-01 RX ORDER — KETOROLAC TROMETHAMINE 30 MG/ML
1 SYRINGE (ML) INJECTION
Qty: 9 | Refills: 0 | OUTPATIENT
Start: 2018-03-01 | End: 2018-03-03

## 2018-03-01 RX ORDER — BUPRENORPHINE AND NALOXONE 2; .5 MG/1; MG/1
2 TABLET SUBLINGUAL
Qty: 0 | Refills: 0 | COMMUNITY

## 2018-03-01 RX ORDER — ACETAMINOPHEN 500 MG
1000 TABLET ORAL ONCE
Qty: 0 | Refills: 0 | Status: COMPLETED | OUTPATIENT
Start: 2018-03-01 | End: 2018-03-01

## 2018-03-01 RX ORDER — BUPRENORPHINE AND NALOXONE 2; .5 MG/1; MG/1
1 TABLET SUBLINGUAL
Qty: 5 | Refills: 0 | OUTPATIENT
Start: 2018-03-01 | End: 2018-03-05

## 2018-03-01 RX ADMIN — ENOXAPARIN SODIUM 40 MILLIGRAM(S): 100 INJECTION SUBCUTANEOUS at 11:01

## 2018-03-01 RX ADMIN — METHOCARBAMOL 750 MILLIGRAM(S): 500 TABLET, FILM COATED ORAL at 11:02

## 2018-03-01 RX ADMIN — Medication 150 MILLIGRAM(S): at 05:28

## 2018-03-01 RX ADMIN — Medication 400 MILLIGRAM(S): at 06:44

## 2018-03-01 RX ADMIN — BUPRENORPHINE AND NALOXONE 2 TABLET(S): 2; .5 TABLET SUBLINGUAL at 13:30

## 2018-03-01 RX ADMIN — LIDOCAINE 2 PATCH: 4 CREAM TOPICAL at 11:02

## 2018-03-01 RX ADMIN — Medication 30 MILLIGRAM(S): at 11:03

## 2018-03-01 RX ADMIN — LIDOCAINE 2 PATCH: 4 CREAM TOPICAL at 00:15

## 2018-03-01 RX ADMIN — Medication 30 MILLIGRAM(S): at 11:15

## 2018-03-01 RX ADMIN — Medication 1000 MILLIGRAM(S): at 07:05

## 2018-03-01 RX ADMIN — BUPRENORPHINE AND NALOXONE 2 TABLET(S): 2; .5 TABLET SUBLINGUAL at 14:00

## 2018-03-01 RX ADMIN — METHOCARBAMOL 750 MILLIGRAM(S): 500 TABLET, FILM COATED ORAL at 05:28

## 2018-03-01 NOTE — PROGRESS NOTE ADULT - SUBJECTIVE AND OBJECTIVE BOX
GYNECOLOGIC ONCOLOGY PROGRESS NOTE    POD#2    PROBLEMS:  BRCA2 positive  History of substance abuse  Post-operative pain    Pt seen and examined at bedside. Patient reports pain was well controlled overnight.    SUBJECTIVE:    Patient is without complaints.  Denies Nausea, Vomiting or Diarrhea.  Denies shortness of breath, chest pain or dyspnea on exertion.  Tolerating diet.    OBJECTIVE:     VITALS:  T(F): 98.6 (03-01-18 @ 00:30), Max: 98.6 (03-01-18 @ 00:30)  HR: 59 (03-01-18 @ 00:30) (59 - 83)  BP: 112/66 (03-01-18 @ 00:30) (90/60 - 117/77)  RR: 18 (03-01-18 @ 00:30) (18 - 18)  SpO2: 93% (03-01-18 @ 00:30) (93% - 95%)      MEDICATIONS  (STANDING):  buprenorphine 2 mG/naloxone 0.5 mG SL  Tablet 1 Tablet(s) SubLingual at bedtime  buprenorphine 2 mG/naloxone 0.5 mG SL  Tablet 2 Tablet(s) SubLingual daily  dextrose 5% + sodium chloride 0.45% with potassium chloride 20 mEq/L 1000 milliLiter(s) (125 mL/Hr) IV Continuous <Continuous>  DULoxetine 60 milliGRAM(s) Oral daily  enoxaparin Injectable 40 milliGRAM(s) SubCutaneous daily  lidocaine   Patch 2 Patch Transdermal daily  methocarbamol 750 milliGRAM(s) Oral every 6 hours  pregabalin 150 milliGRAM(s) Oral two times a day  QUEtiapine 25 milliGRAM(s) Oral daily    MEDICATIONS  (PRN):  acetaminophen   Tablet. 975 milliGRAM(s) Oral every 6 hours PRN Mild Pain (1 - 3)  diphenhydrAMINE   Capsule 25 milliGRAM(s) Oral every 4 hours PRN Rash and/or Itching  ketorolac   Injectable 30 milliGRAM(s) IV Push every 6 hours PRN Moderate Pain (4 - 6)  ondansetron Injectable 4 milliGRAM(s) IV Push every 6 hours PRN Postoperative Nausea and/or Vomiting      Physical Exam:  Constitutional: NAD  Pulmonary: Clear to auscultation bilaterally   Cardiovascular: Regular rate and rhythm   Abdomen: soft, non-tender, non-distended, normal bowel sounds  Extremities: No lower extremity edema or calve tenderness, Chely's sign negative.  Incision: Clean, dry, intact.  Without signs of infection or hernia.      LABS:             Awaiting AM labs

## 2018-03-01 NOTE — PROGRESS NOTE ADULT - PROBLEM SELECTOR PLAN 1
s/p above procedure prophylactically   - Dc to home pending AM labs WNL  -Follow up with PMD and pain management recommended within one week.  -Dc instructions given

## 2018-03-01 NOTE — PROGRESS NOTE ADULT - ATTENDING COMMENTS
Patient seen and examined.  Seems very comfortable with pain well controlled.  Agree with plans for discharge.  Instructions discussed.

## 2018-03-02 LAB — SURGICAL PATHOLOGY FINAL REPORT - CH: SIGNIFICANT CHANGE UP

## 2018-05-02 ENCOUNTER — APPOINTMENT (OUTPATIENT)
Dept: OBGYN | Facility: CLINIC | Age: 58
End: 2018-05-02
Payer: COMMERCIAL

## 2018-05-02 VITALS
WEIGHT: 173 LBS | HEIGHT: 65 IN | SYSTOLIC BLOOD PRESSURE: 110 MMHG | DIASTOLIC BLOOD PRESSURE: 70 MMHG | BODY MASS INDEX: 28.82 KG/M2

## 2018-05-02 PROCEDURE — 99214 OFFICE O/P EST MOD 30 MIN: CPT

## 2019-01-24 ENCOUNTER — APPOINTMENT (OUTPATIENT)
Dept: OBGYN | Facility: CLINIC | Age: 59
End: 2019-01-24

## 2019-06-06 NOTE — ASU PREOP CHECKLIST - PATIENT'S PERSONAL PROPERTY REMOVED
June 9, 2019       Leslye Oshea MD  [address]  [address]  [facility] [address]  VIA In Basket      Patient: Jo Ann Arellano   YOB: 1964   Date of Visit: 6/6/2019       Dear Dr. Oshea:    I saw your patient, Jo Ann Arellano, for an evaluation. Below are my notes for this visit with her.    If you have questions, please do not hesitate to call me.      Sincerely,        Jeffrey Vasquez MD        CC: No Recipients  Jeffrey Vasquez MD  6/9/2019 11:34 AM  Sign at close encounter        INITIAL ORTHOPAEDIC CONSULT    6/6/2019      PMD:   Leslye Oshea MD  CONSULTING PROVIDER:  Leslye Oshea MD  DOI:   NA  W/C:  No    REASON FOR CONSULTATION:  Left hand pain and swelling    HISTORY OF PRESENT ILLNESS: Jo Ann Arellano is a 54 year old female seen in consultation for Leslye Oshea MD regarding left hand pain and swelling. Symptoms started about 2 months ago. No injury. She describes a throbbing sensation in the region of her metacarpals. She also notes some tingling sensation in her left ring finger. She has pain that localizes between the MP joints in the index and middle fingers. She states that she was given prednisone in the past, with limited relief. Currently taking ibuprofen.      Past Medical History:   Diagnosis Date   • Arthritis    • Bronchitis    • Emphysema    • Esophageal reflux    • Fracture    • Hemorrhoid    • Kidney stones 3/1/2014    new onset, being tested for 24 hours urine   • Left hand pain 5/8/2019   • RAD (reactive airway disease)    • Thyroid cyst      Past Surgical History:   Procedure Laterality Date   • Colonoscopy  06/09/2004   • Dexa bone density axial skeleton  12/07/2001   • Hysterectomy  02/14/2007   • Thyroid cyst excision  4/1/2013    right thyroid lobectomy     Family History   Problem Relation Age of Onset   • Thyroid Sister    • Emphysema Sister    • Amblyopia Sister    • Emphysema Mother    • Congestive Heart Failure Father    • Emphysema Maternal  Grandmother    • Cataracts Maternal Grandmother    • Emphysema Paternal Grandfather      Social History     Tobacco Use   • Smoking status: Smoker, Current Status Unknown   • Smokeless tobacco: Never Used   • Tobacco comment: smoking about 4 a day   Substance Use Topics   • Alcohol use: Yes     Comment: rare   • Drug use: No     Current Outpatient Medications   Medication Sig   • budesonide-formoterol (SYMBICORT) 160-4.5 MCG/ACT inhaler Inhale 2 puffs into the lungs 2 times daily.   • albuterol 108 (90 Base) MCG/ACT inhaler Inhale 2 puffs into the lungs every 4 hours as needed for Shortness of Breath.   • varenicline (CHANTIX STARTING MONTH PAK) 0.5 MG X 11 & 1 MG X 42 tablet TAKE AS DIRCETED   • varenicline (CHANTIX CONTINUING MONTH MARINA) 1 MG tablet Take 1 tablet by mouth 2 times daily.   • methylPREDNISolone (MEDROL DOSEPAK) 4 MG tablet follow package directions   • ibuprofen (MOTRIN) 200 MG tablet Take 200 mg by mouth every 6 hours as needed for Pain.     No current facility-administered medications for this visit.      Facility-Administered Medications Ordered in Other Visits   Medication   • LIDOCAINE HCL 1 % IJ SOLN Pyxis Override     ALLERGIES:  No Known Allergies    REVIEW OF SYSTEMS:  Constitutional:  Denies fever or chills   Eyes:  Denies change in visual acuity   HENT: Denies nasal congestion or sore throat   Respiratory:  Denies cough or shortness of breath   Cardiovascular:  Denies chest pain or edema  Gastrointestinal:  Denies abdominal pain, nausea, vomiting, bloody stools or diarrhea   Genitourinary:  Denies dysuria   Musculoskeletal:  Denies back pain or joint pain, except as noted in HPI.  Integument:  Denies rash   Neurologic:  Denies headache, focal weakness or sensory changes, except as noted in HPI.  Endocrine:  Denies polyuria or polydipsia  Lymphatic:  Denies swollen glands  Psychiatric:  Denies depression or anxiety    PHYSICAL EXAM:   Visit Vitals  LMP 01/01/2007      General:  Well  groomed, in no apparent distress.    HEENT:  Eyes normal, pupils equal, round and reactive to light and accommodation (PERRLA), sinuses nontender, nose normal, pharynx clear.    Neck:  Supple, no lymphadenopathy, no thyromegaly.    Lungs:  Clear to auscultation (A), Normal inspiratory/expiratory effort.  Cardiac:  Regular rate and rhythm (RRR), no murmur  Abdomen:  Soft, nontender (NT), no guarding.    Extremities:  No cyanosis, clubbing, edema.   Skin:  No abnormal skin lesions.    Neurologic: Alert and oriented x 4. Alert and cooperative. Cranial nerves II-XII intact.  Reflexes 2+ and symmetrical throughout. Normal strength and sensation.  Musculoskeletal: Normal appearance of her left hand. She localizes pain along the radial aspect of her index metacarpal. Good strength in the first dorsal interosseous. No pain over the first dorsal compartment. No tendon subluxation. Nontender in the palm. No triggering. Tinel's negative. Phalen's negative. Finkelstein's test negative. Lares's test negative. No specific tenderness along the course of the superficial radial nerve at the wrist.      X-RAY INTERPRETATION:   5/8/19. X-rays left hand. Normal     IMPRESSION/DIAGNOSIS:   1. Left hand spring    TREATMENT AND RECOMMENDATIONS:   Options were discussed. Continue conservatively.  Given prescription for Mobic.  Referred to OT for ROM and strengthening.  No surgical indications seen.  Follow up in 6 weeks or as needed.    The patient indicates understanding and agreement with the plan of care. All questions have been answered at this time.     Thank you Leslye Oshea MD for requesting my consultation on your patient.    On 6/6/2019, Bernie LINDA scribed the services personally performed by Jeffrey Vasquez MD    The documentation recorded by the scribe accurately and completely reflects the service(s) I personally performed and the decisions made by me.       Jeffrey Vasquez MD                 dentures
